# Patient Record
Sex: FEMALE
[De-identification: names, ages, dates, MRNs, and addresses within clinical notes are randomized per-mention and may not be internally consistent; named-entity substitution may affect disease eponyms.]

---

## 2020-01-20 ENCOUNTER — HOSPITAL ENCOUNTER (EMERGENCY)
Dept: HOSPITAL 5 - ED | Age: 60
Discharge: HOME | End: 2020-01-20
Payer: COMMERCIAL

## 2020-01-20 VITALS — SYSTOLIC BLOOD PRESSURE: 131 MMHG | DIASTOLIC BLOOD PRESSURE: 63 MMHG

## 2020-01-20 DIAGNOSIS — Z88.8: ICD-10-CM

## 2020-01-20 DIAGNOSIS — Z88.0: ICD-10-CM

## 2020-01-20 DIAGNOSIS — T36.8X5A: ICD-10-CM

## 2020-01-20 DIAGNOSIS — Z98.890: ICD-10-CM

## 2020-01-20 DIAGNOSIS — Z79.899: ICD-10-CM

## 2020-01-20 DIAGNOSIS — L29.8: Primary | ICD-10-CM

## 2020-01-20 PROCEDURE — 96375 TX/PRO/DX INJ NEW DRUG ADDON: CPT

## 2020-01-20 PROCEDURE — 99282 EMERGENCY DEPT VISIT SF MDM: CPT

## 2020-01-20 PROCEDURE — 96374 THER/PROPH/DIAG INJ IV PUSH: CPT

## 2020-01-20 NOTE — EMERGENCY DEPARTMENT REPORT
HPI





- General


Chief Complaint: Allergic Reaction


Time Seen by Provider: 01/20/20 18:52





- HPI


HPI: 





Mrs. Higginbotham is a 58 yo female without significant past medical hx who presents 

with drug reaction after taking bactrim.  She took one pill today for UTI.  She 

has red itchy palms and lips/tongue itching.  No previous history of drug 

allergy.  She denies shortness of breath.  She denies trouble swallowing.  No 

chest pain or syncope.  Denies abdominal pain or vomiting.  No rash or itching 

in any other part of the body.





Sudden onset of symptoms just prior to arrival.  She did not attempt any 

medication to treat.





ED Past Medical Hx





- Past Medical History


Previous Medical History?: No





- Surgical History


Past Surgical History?: Yes


Additional Surgical History: left shoulder surgery





- Social History


Smoking Status: Never Smoker


Substance Use Type: None





- Medications


Home Medications: 


                                Home Medications











 Medication  Instructions  Recorded  Confirmed  Last Taken  Type


 


Famotidine [Pepcid] 20 mg PO BID 3 Days #6 tablet 01/20/20  Unknown Rx


 


Nitrofurantoin Mono/M-Cryst 100 mg PO Q12HR 5 Days #10 capsule 01/20/20  Unknown

 Rx





[Macrobid CAP]     


 


diphenhydrAMINE [Benadryl CAP] 25 mg PO TID 3 Days #9 capsule 01/20/20  Unknown 

Rx


 


predniSONE [Deltasone] 3 tab PO QDAY 3 Days #9 tab 01/20/20  Unknown Rx














ED Review of Systems


ROS: 


Stated complaint: ALLERGIC REACTION/TONGUE BIG


Other details as noted in HPI





Comment: All other systems reviewed and negative


Constitutional: denies: fever, malaise


Respiratory: denies: cough, shortness of breath


Cardiovascular: denies: chest pain


Gastrointestinal: denies: abdominal pain, nausea, vomiting


Skin: rash





Physical Exam





- Physical Exam


Vital Signs: 


                                   Vital Signs











  01/20/20





  18:36


 


Temperature 98.6 F


 


Pulse Rate 110 H


 


Respiratory 20





Rate 


 


Blood Pressure 149/96


 


O2 Sat by Pulse 98





Oximetry 











General: 





Gen.: Well-appearing no distress normal voice


HEENT normocephalic/atraumatic anicteric sclera normal lip size normal tongue 

size


Neck: No edema no stridor


Chest clear to auscultation bilaterally no rales no rhonchi no wheezes


Cardiac regular rate and rhythm no murmurs or rubs gallops


Abdomen soft nontender nondistended


Extremities palmar erythema no urticaria


Skin: No urticaria of the torso extremities


Psychiatric: Pleasant demeanor alert and oriented 4


Neuro: Ambulatory without difficulty, normal brisk gait moves all 4 extremities 

fully and fluidly 





ED Course


                                   Vital Signs











  01/20/20





  18:36


 


Temperature 98.6 F


 


Pulse Rate 110 H


 


Respiratory 20





Rate 


 


Blood Pressure 149/96


 


O2 Sat by Pulse 98





Oximetry 














ED Medical Decision Making





- Medical Decision Making





Acute drug allergic reaction to bactrim:  Treated with Solu-Medrol famotidine 

Benadryl.  Prescribed macrobid to replace the Bactrim.  Also prescribed 

famotidine prednisone and Benadryl.  She was given verbal and written education.

  She understands to avoid Bactrim and sulfa drugs in the future.


Critical care attestation.: 


If time is entered above; I have spent that time in minutes in the direct care 

of this critically ill patient, excluding procedure time.








ED Disposition


Clinical Impression: 


 Allergic reaction to drug





Disposition: DC-01 TO HOME OR SELFCARE


Is pt being admited?: No


Does the pt Need Aspirin: No


Condition: Stable


Instructions:  Antibiotic Medication Allergy (ED)


Additional Instructions: 


You have developed an allergy to bactrim and sulfa drugs.  Please avoid this 

medications in the future.


Prescriptions: 


diphenhydrAMINE [Benadryl CAP] 25 mg PO TID 3 Days #9 capsule


predniSONE [Deltasone] 3 tab PO QDAY 3 Days #9 tab


Nitrofurantoin Mono/M-Cryst [Macrobid CAP] 100 mg PO Q12HR 5 Days #10 capsule


Famotidine [Pepcid] 20 mg PO BID 3 Days #6 tablet


Referrals: 


ABDOULAYE STRATTON MD [Staff Physician] - 3-5 Days

## 2022-01-07 ENCOUNTER — HOSPITAL ENCOUNTER (OUTPATIENT)
Dept: HOSPITAL 5 - MAMMO | Age: 62
Discharge: HOME | End: 2022-01-07
Attending: NURSE PRACTITIONER
Payer: COMMERCIAL

## 2022-01-07 DIAGNOSIS — Z12.31: Primary | ICD-10-CM

## 2022-01-07 PROCEDURE — 77067 SCR MAMMO BI INCL CAD: CPT

## 2022-01-07 NOTE — MAMMOGRAPHY REPORT
DIGITAL SCREENING MAMMOGRAM WITH CAD, 1/7/2022



CLINICAL INFORMATION / INDICATION: Routine screening mammography. Remote history of left breast cyst 
aspiration.



TECHNIQUE:  Digital bilateral 2D mammography was obtained in the craniocaudal and mediolateral obliqu
e projections. This examination was interpreted with the benefit of Computer-Aided Detection analysis
.



COMPARISON: 04/01/11



FINDINGS: 



Breast Density: The breasts are heterogeneously dense, which may obscure small masses.



No dominant mass, suspicious calcifications, or architectural distortion in either breast. 



Bilateral benign-appearing nodularity appears stable.

 

IMPRESSION: No mammographic evidence of malignancy. No significant interval change.



Follow up recommendation: Routine yearly



BI-RADS Category 2:  BENIGN.





-------------------------------------------------------------------------------------------

A "normal" or negative report should not discourage follow up or biopsy of a clinically significant f
inding.



A written summary of these findings will be mailed to the patient. The patient will be entered into a
 mammography reporting system which will generate a reminder letter for the patient's next appointmen
t at the appropriate interval.



The [employer] recommends yearly mammograms starting at age 40 and continuing as l
sofia as a woman is in good health.  Breast MRI is recommended for women with an approximate 20-25% or 
greater lifetime risk of breast cancer, including women with a strong family history of breast or ova
noemí cancer or who have been treated for Hodgkin's disease.



Signer Name: TRINH Sánchez MD 

Signed: 1/7/2022 11:32 AM

Workstation Name: [id number]

## 2022-03-30 ENCOUNTER — HOSPITAL ENCOUNTER (EMERGENCY)
Dept: HOSPITAL 5 - ED | Age: 62
Discharge: HOME | End: 2022-03-30
Payer: COMMERCIAL

## 2022-03-30 VITALS — DIASTOLIC BLOOD PRESSURE: 58 MMHG | SYSTOLIC BLOOD PRESSURE: 137 MMHG

## 2022-03-30 DIAGNOSIS — H66.90: Primary | ICD-10-CM

## 2022-03-30 PROCEDURE — 99282 EMERGENCY DEPT VISIT SF MDM: CPT

## 2022-03-30 NOTE — EMERGENCY DEPARTMENT REPORT
ED General Adult HPI





- General


Chief complaint: Earache


Stated complaint: RT EARACHE AND NECK PAIN


Time Seen by Provider: 03/30/22 05:22


Source: patient


Mode of arrival: Ambulatory


Limitations: No Limitations





- History of Present Illness


Initial comments: 





61-year-old female presents emergency department complaining of a few day 

history of right ear pain and a dull throbbing fashion associated with nasal 

congestion and the weather change.  She reports no tinnitus no presyncope but 

the pain does radiate to the back of her ear and up and down her neck.  No 

fever, chills, sweats.  No hemoptysis hematemesis hematochezia, no nausea no 

vomiting


-: Gradual


Radiation: non-radiation


Quality: dull


Consistency: constant


Improves with: none


Worsens with: none


Associated Symptoms: denies: confusion, chest pain, loss of appetite, rash, 

weakness


Treatments Prior to Arrival: none





- Related Data


                                  Previous Rx's











 Medication  Instructions  Recorded  Last Taken  Type


 


Famotidine [Pepcid] 20 mg PO BID 3 Days #6 tablet 01/20/20 Unknown Rx


 


Nitrofurantoin Mono/M-Cryst 100 mg PO Q12HR 5 Days #10 capsule 01/20/20 Unknown 

Rx





[Macrobid CAP]    


 


diphenhydrAMINE [Benadryl CAP] 25 mg PO TID 3 Days #9 capsule 01/20/20 Unknown 

Rx


 


predniSONE [Deltasone] 3 tab PO QDAY 3 Days #9 tab 01/20/20 Unknown Rx


 


Cefdinir 300 mg PO BID #20 cap 03/30/22 Unknown Rx











                                    Allergies











Allergy/AdvReac Type Severity Reaction Status Date / Time


 


Penicillins Allergy  Unknown Verified 01/20/20 18:37


 


sulfamethoxazole Allergy  Unknown Verified 01/20/20 18:37





[From Bactrim]     


 


trimethoprim [From Bactrim] Allergy  Unknown Verified 01/20/20 18:37














ED Review of Systems


ROS: 


Stated complaint: RT EARACHE AND NECK PAIN


Other details as noted in HPI





Comment: All other systems reviewed and negative





ED Past Medical Hx





- Surgical History


Additional Surgical History: left shoulder surgery





- Social History


Smoking Status: Never Smoker


Substance Use Type: None





- Medications


Home Medications: 


                                Home Medications











 Medication  Instructions  Recorded  Confirmed  Last Taken  Type


 


Famotidine [Pepcid] 20 mg PO BID 3 Days #6 tablet 01/20/20  Unknown Rx


 


Nitrofurantoin Mono/M-Cryst 100 mg PO Q12HR 5 Days #10 capsule 01/20/20  Unknown

 Rx





[Macrobid CAP]     


 


diphenhydrAMINE [Benadryl CAP] 25 mg PO TID 3 Days #9 capsule 01/20/20  Unknown 

Rx


 


predniSONE [Deltasone] 3 tab PO QDAY 3 Days #9 tab 01/20/20  Unknown Rx


 


Cefdinir 300 mg PO BID #20 cap 03/30/22  Unknown Rx














ED Physical Exam





- General


Limitations: No Limitations


General appearance: alert, in no apparent distress





- Head


Head exam: Present: atraumatic, normocephalic





- Eye


Eye exam: Present: normal appearance, PERRL, EOMI





- ENT


ENT exam: Present: normal exam, normal orophraynx, mucous membranes moist, TM's 

normal bilaterally





- Neck


Neck exam: Present: normal inspection, full ROM





- Respiratory


Respiratory exam: Present: normal lung sounds bilaterally.  Absent: respiratory 

distress, wheezes, rales, chest wall tenderness, accessory muscle use





- Cardiovascular


Cardiovascular Exam: Present: regular rate, normal rhythm.  Absent: systolic 

murmur, diastolic murmur, rubs, gallop





- GI/Abdominal


GI/Abdominal exam: Present: soft, normal bowel sounds





- Extremities Exam


Extremities exam: Present: normal inspection





- Back Exam


Back exam: Present: normal inspection





- Neurological Exam


Neurological exam: Present: alert, oriented X3





- Psychiatric


Psychiatric exam: Present: normal affect, normal mood





- Skin


Skin exam: Present: warm, dry, intact, normal color.  Absent: rash





ED Course





                                   Vital Signs











  03/30/22





  03:45


 


Temperature 99.9 F H


 


Pulse Rate 121 H


 


Respiratory 18





Rate 


 


Blood Pressure 175/79


 


O2 Sat by Pulse 96





Oximetry 














ED Medical Decision Making





- Radiology Data


Radiology results: report reviewed





- Medical Decision Making


Exam and history Ms. Higginbotham consistent with otitis media. I have low suspicion 

at this time for mastoiditis, malignant otitis externa, herpes, retained foreign

 body. No evidence of any tympanic membrane rupture or 4th cranial nerve palsy. 

 Currently awake alert no acute distress ambulatory no signs of presyncope at 

this present time or prior to arrival.  Heart rate was reportedly 121 at the 

time of triage at the time of examination heart rate was 97 without medication





plan will provide a prescription for antibiotics for the next 7 to 10 days. And 

return precautions were were discussed with full understanding.


Critical care attestation.: 


If time is entered above; I have spent that time in minutes in the direct care 

of this critically ill patient, excluding procedure time.








ED Disposition


Clinical Impression: 


 Otitis media





Disposition: 01 HOME / SELF CARE / HOMELESS


Is pt being admited?: No


Does the pt Need Aspirin: No


Condition: Stable


Instructions:  Otitis Media, Adult


Prescriptions: 


Cefdinir 300 mg PO BID #20 cap


Referrals: 


PRIMARY CARE,MD [Primary Care Provider] - 3-5 Days


JOSELIN MCCORD MD [Staff Physician] - 3-5 Days